# Patient Record
Sex: FEMALE | Race: WHITE
[De-identification: names, ages, dates, MRNs, and addresses within clinical notes are randomized per-mention and may not be internally consistent; named-entity substitution may affect disease eponyms.]

---

## 2019-04-27 ENCOUNTER — HOSPITAL ENCOUNTER (OUTPATIENT)
Dept: HOSPITAL 96 - M.INFUS | Age: 59
End: 2019-04-27
Payer: COMMERCIAL

## 2019-04-27 DIAGNOSIS — N30.00: Primary | ICD-10-CM

## 2019-04-28 ENCOUNTER — HOSPITAL ENCOUNTER (OUTPATIENT)
Dept: HOSPITAL 96 - M.INFUS | Age: 59
End: 2019-04-28
Payer: COMMERCIAL

## 2019-04-28 DIAGNOSIS — N30.00: Primary | ICD-10-CM

## 2020-01-15 ENCOUNTER — HOSPITAL ENCOUNTER (OUTPATIENT)
Dept: HOSPITAL 96 - M.INFUS | Age: 60
End: 2020-01-15
Attending: NURSE PRACTITIONER
Payer: COMMERCIAL

## 2020-01-15 VITALS — DIASTOLIC BLOOD PRESSURE: 80 MMHG | SYSTOLIC BLOOD PRESSURE: 129 MMHG

## 2020-01-15 DIAGNOSIS — Z88.9: ICD-10-CM

## 2020-01-15 DIAGNOSIS — N30.90: Primary | ICD-10-CM

## 2020-01-15 DIAGNOSIS — B96.1: ICD-10-CM

## 2020-01-15 LAB
BILIRUB UR-MCNC: NEGATIVE MG/DL
COLOR UR: YELLOW
KETONES UR STRIP-MCNC: NEGATIVE MG/DL
PROT UR QL STRIP: NEGATIVE
RBC # UR STRIP: (no result) /UL
SP GR UR STRIP: 1.02 (ref 1–1.03)
URINE CLARITY: CLEAR
URINE GLUCOSE-RANDOM: NEGATIVE
URINE LEUKOCYTES-REFLEX: NEGATIVE
URINE NITRITE-REFLEX: NEGATIVE
UROBILINOGEN UR STRIP-ACNC: 0.2 E.U./DL (ref 0.2–1)

## 2020-01-15 NOTE — NUR
ARRIVED AMBULATORY. HISTORY,MEDS AND ALLERGY REVIEWED AND UPDATED. MIDILNE
PLACED BY INFUSION STAFF. INFUSION COMPLETED AND TOELRATED WELL. MIDLINE
FLUSHED AND WRAPPED FOR USE NEXT 4+ DAYS. DISCHRGE INTRUCTION REVIEWED. DENIES
QUESTIONS OR NEEDS AT DISCHARGE.

## 2020-01-16 ENCOUNTER — HOSPITAL ENCOUNTER (OUTPATIENT)
Dept: HOSPITAL 96 - M.INFUS | Age: 60
End: 2020-01-16
Attending: NURSE PRACTITIONER
Payer: COMMERCIAL

## 2020-01-16 DIAGNOSIS — B96.1: ICD-10-CM

## 2020-01-16 DIAGNOSIS — N30.90: Primary | ICD-10-CM

## 2020-01-16 DIAGNOSIS — Z88.9: ICD-10-CM

## 2020-01-17 ENCOUNTER — HOSPITAL ENCOUNTER (OUTPATIENT)
Dept: HOSPITAL 96 - M.INFUS | Age: 60
End: 2020-01-17
Attending: NURSE PRACTITIONER
Payer: COMMERCIAL

## 2020-01-17 VITALS — DIASTOLIC BLOOD PRESSURE: 78 MMHG | SYSTOLIC BLOOD PRESSURE: 127 MMHG

## 2020-01-17 DIAGNOSIS — Z88.9: ICD-10-CM

## 2020-01-17 DIAGNOSIS — N30.90: Primary | ICD-10-CM

## 2020-01-17 DIAGNOSIS — B96.1: ICD-10-CM

## 2020-01-18 ENCOUNTER — HOSPITAL ENCOUNTER (OUTPATIENT)
Dept: HOSPITAL 96 - M.INFUS | Age: 60
End: 2020-01-18
Attending: NURSE PRACTITIONER
Payer: COMMERCIAL

## 2020-01-18 DIAGNOSIS — Z88.9: ICD-10-CM

## 2020-01-18 DIAGNOSIS — N30.90: Primary | ICD-10-CM

## 2020-01-18 DIAGNOSIS — B96.1: ICD-10-CM

## 2020-01-19 ENCOUNTER — HOSPITAL ENCOUNTER (OUTPATIENT)
Dept: HOSPITAL 96 - M.INFUS | Age: 60
End: 2020-01-19
Attending: NURSE PRACTITIONER
Payer: COMMERCIAL

## 2020-01-19 DIAGNOSIS — Z88.9: ICD-10-CM

## 2020-01-19 DIAGNOSIS — N30.90: Primary | ICD-10-CM

## 2020-01-19 DIAGNOSIS — B96.1: ICD-10-CM

## 2020-02-01 ENCOUNTER — HOSPITAL ENCOUNTER (OUTPATIENT)
Dept: HOSPITAL 96 - M.INFUS | Age: 60
End: 2020-02-01
Attending: NURSE PRACTITIONER
Payer: COMMERCIAL

## 2020-02-01 DIAGNOSIS — B96.1: ICD-10-CM

## 2020-02-01 DIAGNOSIS — Z88.9: ICD-10-CM

## 2020-02-01 DIAGNOSIS — N30.90: Primary | ICD-10-CM

## 2020-02-02 ENCOUNTER — HOSPITAL ENCOUNTER (OUTPATIENT)
Dept: HOSPITAL 96 - M.INFUS | Age: 60
End: 2020-02-02
Attending: NURSE PRACTITIONER
Payer: COMMERCIAL

## 2020-02-02 DIAGNOSIS — N30.90: Primary | ICD-10-CM

## 2020-02-02 DIAGNOSIS — Z88.9: ICD-10-CM

## 2020-02-02 DIAGNOSIS — B96.1: ICD-10-CM

## 2020-02-03 ENCOUNTER — HOSPITAL ENCOUNTER (OUTPATIENT)
Dept: HOSPITAL 96 - M.INFUS | Age: 60
End: 2020-02-03
Attending: NURSE PRACTITIONER
Payer: COMMERCIAL

## 2020-02-03 VITALS — DIASTOLIC BLOOD PRESSURE: 75 MMHG | SYSTOLIC BLOOD PRESSURE: 122 MMHG

## 2020-02-03 DIAGNOSIS — B96.1: ICD-10-CM

## 2020-02-03 DIAGNOSIS — N30.90: Primary | ICD-10-CM

## 2020-02-04 ENCOUNTER — HOSPITAL ENCOUNTER (OUTPATIENT)
Dept: HOSPITAL 96 - M.INFUS | Age: 60
End: 2020-02-04
Attending: NURSE PRACTITIONER
Payer: COMMERCIAL

## 2020-02-04 VITALS — DIASTOLIC BLOOD PRESSURE: 72 MMHG | SYSTOLIC BLOOD PRESSURE: 118 MMHG

## 2020-02-04 DIAGNOSIS — N30.90: Primary | ICD-10-CM

## 2020-02-04 DIAGNOSIS — B96.1: ICD-10-CM

## 2020-02-05 ENCOUNTER — HOSPITAL ENCOUNTER (OUTPATIENT)
Dept: HOSPITAL 96 - M.INFUS | Age: 60
End: 2020-02-05
Attending: NURSE PRACTITIONER
Payer: COMMERCIAL

## 2020-02-05 VITALS — SYSTOLIC BLOOD PRESSURE: 128 MMHG | DIASTOLIC BLOOD PRESSURE: 78 MMHG

## 2020-02-05 DIAGNOSIS — N30.90: Primary | ICD-10-CM

## 2020-02-05 DIAGNOSIS — B96.1: ICD-10-CM

## 2020-02-05 DIAGNOSIS — Z88.9: ICD-10-CM

## 2020-02-06 ENCOUNTER — HOSPITAL ENCOUNTER (OUTPATIENT)
Dept: HOSPITAL 96 - M.INFUS | Age: 60
End: 2020-02-06
Attending: NURSE PRACTITIONER
Payer: COMMERCIAL

## 2020-02-06 VITALS — DIASTOLIC BLOOD PRESSURE: 76 MMHG | SYSTOLIC BLOOD PRESSURE: 124 MMHG

## 2020-02-06 DIAGNOSIS — N30.90: Primary | ICD-10-CM

## 2020-02-06 DIAGNOSIS — Z88.9: ICD-10-CM

## 2020-02-06 DIAGNOSIS — B96.1: ICD-10-CM

## 2020-02-06 NOTE — NUR
DENIES ADVERSE REACTION TO MULTIPLE INFUSIONS OF SAME. INFUSION COMPELTED AND
TOELERATED WELL. MIDLINE PATENT WTIH EASY FLUSH AND BLOOD RETURN.

## 2020-02-07 ENCOUNTER — HOSPITAL ENCOUNTER (OUTPATIENT)
Dept: HOSPITAL 96 - M.INFUS | Age: 60
End: 2020-02-07
Attending: NURSE PRACTITIONER
Payer: COMMERCIAL

## 2020-02-07 VITALS — SYSTOLIC BLOOD PRESSURE: 122 MMHG | DIASTOLIC BLOOD PRESSURE: 74 MMHG

## 2020-02-07 DIAGNOSIS — N30.90: Primary | ICD-10-CM

## 2020-02-07 DIAGNOSIS — B95.1: ICD-10-CM

## 2020-02-07 DIAGNOSIS — Z88.9: ICD-10-CM

## 2020-02-08 ENCOUNTER — HOSPITAL ENCOUNTER (OUTPATIENT)
Dept: HOSPITAL 96 - M.INFUS | Age: 60
End: 2020-02-08
Attending: NURSE PRACTITIONER
Payer: COMMERCIAL

## 2020-02-08 DIAGNOSIS — N30.90: Primary | ICD-10-CM

## 2020-02-08 DIAGNOSIS — B96.1: ICD-10-CM

## 2020-02-08 DIAGNOSIS — Z88.9: ICD-10-CM

## 2020-02-09 ENCOUNTER — HOSPITAL ENCOUNTER (OUTPATIENT)
Dept: HOSPITAL 96 - M.INFUS | Age: 60
End: 2020-02-09
Attending: NURSE PRACTITIONER
Payer: COMMERCIAL

## 2020-02-09 DIAGNOSIS — B96.1: ICD-10-CM

## 2020-02-09 DIAGNOSIS — N30.90: Primary | ICD-10-CM

## 2020-02-09 DIAGNOSIS — Z88.9: ICD-10-CM

## 2020-02-10 ENCOUNTER — HOSPITAL ENCOUNTER (OUTPATIENT)
Dept: HOSPITAL 96 - M.INFUS | Age: 60
End: 2020-02-10
Attending: NURSE PRACTITIONER
Payer: COMMERCIAL

## 2020-02-10 VITALS — DIASTOLIC BLOOD PRESSURE: 71 MMHG | SYSTOLIC BLOOD PRESSURE: 121 MMHG

## 2020-02-10 DIAGNOSIS — N30.90: Primary | ICD-10-CM

## 2020-02-10 DIAGNOSIS — Z88.9: ICD-10-CM

## 2020-02-10 DIAGNOSIS — B96.1: ICD-10-CM

## 2020-06-29 ENCOUNTER — HOSPITAL ENCOUNTER (OUTPATIENT)
Dept: HOSPITAL 96 - M.PC | Age: 60
End: 2020-06-29
Attending: PHYSICAL MEDICINE & REHABILITATION
Payer: COMMERCIAL

## 2020-06-29 DIAGNOSIS — M50.30: ICD-10-CM

## 2020-06-29 DIAGNOSIS — G96.19: ICD-10-CM

## 2020-06-29 DIAGNOSIS — M47.22: Primary | ICD-10-CM

## 2020-06-29 DIAGNOSIS — Z88.8: ICD-10-CM

## 2020-06-29 DIAGNOSIS — Z79.899: ICD-10-CM

## 2020-10-17 ENCOUNTER — HOSPITAL ENCOUNTER (EMERGENCY)
Dept: HOSPITAL 96 - M.ERS | Age: 60
Discharge: HOME | End: 2020-10-17
Payer: COMMERCIAL

## 2020-10-17 VITALS — HEIGHT: 62 IN | BODY MASS INDEX: 23.92 KG/M2 | WEIGHT: 130.01 LBS

## 2020-10-17 VITALS — DIASTOLIC BLOOD PRESSURE: 78 MMHG | SYSTOLIC BLOOD PRESSURE: 119 MMHG

## 2020-10-17 DIAGNOSIS — Z88.1: ICD-10-CM

## 2020-10-17 DIAGNOSIS — Z90.49: ICD-10-CM

## 2020-10-17 DIAGNOSIS — Z88.2: ICD-10-CM

## 2020-10-17 DIAGNOSIS — U07.1: Primary | ICD-10-CM

## 2021-03-11 ENCOUNTER — APPOINTMENT (RX ONLY)
Dept: URBAN - METROPOLITAN AREA CLINIC 142 | Facility: CLINIC | Age: 61
Setting detail: DERMATOLOGY
End: 2021-03-11

## 2021-03-11 DIAGNOSIS — L738 OTHER SPECIFIED DISEASES OF HAIR AND HAIR FOLLICLES: ICD-10-CM | Status: INADEQUATELY CONTROLLED

## 2021-03-11 DIAGNOSIS — L73.9 FOLLICULAR DISORDER, UNSPECIFIED: ICD-10-CM | Status: INADEQUATELY CONTROLLED

## 2021-03-11 DIAGNOSIS — L663 OTHER SPECIFIED DISEASES OF HAIR AND HAIR FOLLICLES: ICD-10-CM | Status: INADEQUATELY CONTROLLED

## 2021-03-11 DIAGNOSIS — L73.8 OTHER SPECIFIED FOLLICULAR DISORDERS: ICD-10-CM

## 2021-03-11 DIAGNOSIS — L65.0 TELOGEN EFFLUVIUM: ICD-10-CM | Status: INADEQUATELY CONTROLLED

## 2021-03-11 PROBLEM — L02.821 FURUNCLE OF HEAD [ANY PART, EXCEPT FACE]: Status: ACTIVE | Noted: 2021-03-11

## 2021-03-11 PROBLEM — L02.12 FURUNCLE OF NECK: Status: ACTIVE | Noted: 2021-03-11

## 2021-03-11 PROCEDURE — ? SKIN MEDICINALS

## 2021-03-11 PROCEDURE — ? PRESCRIPTION

## 2021-03-11 PROCEDURE — 99204 OFFICE O/P NEW MOD 45 MIN: CPT

## 2021-03-11 PROCEDURE — ? COUNSELING

## 2021-03-11 RX ORDER — TRETIONIN 0.25 MG/G
CREAM TOPICAL
Qty: 1 | Refills: 3 | Status: ERX | COMMUNITY
Start: 2021-03-11

## 2021-03-11 RX ORDER — CLINDAMYCIN PHOSPHATE 10 MG/ML
LOTION TOPICAL BID
Qty: 1 | Refills: 6 | Status: ERX | COMMUNITY
Start: 2021-03-11

## 2021-03-11 RX ADMIN — TRETIONIN: 0.25 CREAM TOPICAL at 00:00

## 2021-03-11 RX ADMIN — CLINDAMYCIN PHOSPHATE: 10 LOTION TOPICAL at 00:00

## 2021-03-11 ASSESSMENT — LOCATION SIMPLE DESCRIPTION DERM
LOCATION SIMPLE: SCALP
LOCATION SIMPLE: LEFT CHEEK
LOCATION SIMPLE: POSTERIOR NECK
LOCATION SIMPLE: LEFT SCALP

## 2021-03-11 ASSESSMENT — LOCATION ZONE DERM
LOCATION ZONE: FACE
LOCATION ZONE: SCALP
LOCATION ZONE: NECK

## 2021-03-11 ASSESSMENT — LOCATION DETAILED DESCRIPTION DERM
LOCATION DETAILED: LEFT CENTRAL MALAR CHEEK
LOCATION DETAILED: LEFT SUPERIOR PARIETAL SCALP
LOCATION DETAILED: LEFT CENTRAL FRONTAL SCALP
LOCATION DETAILED: MID POSTERIOR NECK

## 2021-03-11 ASSESSMENT — PAIN INTENSITY VAS: HOW INTENSE IS YOUR PAIN 0 BEING NO PAIN, 10 BEING THE MOST SEVERE PAIN POSSIBLE?: NO PAIN

## 2021-03-11 NOTE — PROCEDURE: [facility] [employer]
Sig: Apply pea sized amount per area at night
Sig: Apply to affected areas on face twice daily
Sig: Apply twice daily for 5 days
Intro Statement: I recommended the following products:OTCs Retoinls  (if Rx too expensive)
Sig: Apply to affected areas twice daily
Sig: Apply nightly to warts nightly under occlusion
Sig: Apply a thin layer to affected areas twice daily for 6 weeks
Sig: Wash affected areas daily.
Detail Level: Generalized
Product Type (1): Anti-Aging

## 2021-03-11 NOTE — HPI: HAIR LOSS
Previous Labs: Yes
How Did The Hair Loss Occur?: sudden in onset
How Severe Is Your Hair Loss?: severe
Lab Details: 2/2021

## 2021-12-22 ENCOUNTER — HOSPITAL ENCOUNTER (EMERGENCY)
Dept: HOSPITAL 96 - M.ERS | Age: 61
Discharge: HOME | End: 2021-12-22
Payer: COMMERCIAL

## 2021-12-22 VITALS — DIASTOLIC BLOOD PRESSURE: 80 MMHG | SYSTOLIC BLOOD PRESSURE: 144 MMHG

## 2021-12-22 VITALS — BODY MASS INDEX: 23.92 KG/M2 | HEIGHT: 62 IN | WEIGHT: 130.01 LBS

## 2021-12-22 DIAGNOSIS — R53.1: Primary | ICD-10-CM

## 2021-12-22 DIAGNOSIS — Z87.891: ICD-10-CM

## 2021-12-22 DIAGNOSIS — Z79.899: ICD-10-CM

## 2021-12-22 DIAGNOSIS — Z88.2: ICD-10-CM

## 2021-12-22 DIAGNOSIS — R53.83: ICD-10-CM

## 2021-12-22 DIAGNOSIS — Z86.14: ICD-10-CM

## 2021-12-22 DIAGNOSIS — F32.9: ICD-10-CM

## 2021-12-22 DIAGNOSIS — H53.8: ICD-10-CM

## 2021-12-22 DIAGNOSIS — Z90.49: ICD-10-CM

## 2021-12-22 DIAGNOSIS — Z88.1: ICD-10-CM

## 2021-12-22 LAB
ABSOLUTE BASOPHILS: 0.1 THOU/UL (ref 0–0.2)
ABSOLUTE EOSINOPHILS: 0.2 THOU/UL (ref 0–0.7)
ABSOLUTE MONOCYTES: 0.7 THOU/UL (ref 0–1.2)
ALBUMIN SERPL-MCNC: 4.1 G/DL (ref 3.4–5)
ALP SERPL-CCNC: 54 U/L (ref 46–116)
ALT SERPL-CCNC: 19 U/L (ref 30–65)
ANION GAP SERPL CALC-SCNC: 6 MMOL/L (ref 7–16)
AST SERPL-CCNC: 12 U/L (ref 15–37)
BASOPHILS NFR BLD AUTO: 1.3 %
BILIRUB SERPL-MCNC: 1 MG/DL
BUN SERPL-MCNC: 12 MG/DL (ref 7–18)
CALCIUM SERPL-MCNC: 9.2 MG/DL (ref 8.5–10.1)
CHLORIDE SERPL-SCNC: 102 MMOL/L (ref 98–107)
CO2 SERPL-SCNC: 30 MMOL/L (ref 21–32)
CREAT SERPL-MCNC: 0.9 MG/DL (ref 0.6–1.3)
EOSINOPHIL NFR BLD: 2.5 %
GLUCOSE SERPL-MCNC: 99 MG/DL (ref 70–99)
GRANULOCYTES NFR BLD MANUAL: 48 %
HCT VFR BLD CALC: 38.3 % (ref 37–47)
HGB BLD-MCNC: 12.9 GM/DL (ref 12–15)
LYMPHOCYTES # BLD: 3.1 THOU/UL (ref 0.8–5.3)
LYMPHOCYTES NFR BLD AUTO: 39.4 %
MCH RBC QN AUTO: 32.7 PG (ref 26–34)
MCHC RBC AUTO-ENTMCNC: 33.6 G/DL (ref 28–37)
MCV RBC: 97.3 FL (ref 80–100)
MONOCYTES NFR BLD: 8.8 %
MPV: 6.3 FL. (ref 7.2–11.1)
NEUTROPHILS # BLD: 3.8 THOU/UL (ref 1.6–8.1)
NUCLEATED RBCS: 0 /100WBC
PLATELET COUNT*: 485 THOU/UL (ref 150–400)
POTASSIUM SERPL-SCNC: 4.1 MMOL/L (ref 3.5–5.1)
PROT SERPL-MCNC: 7.3 G/DL (ref 6.4–8.2)
RBC # BLD AUTO: 3.94 MIL/UL (ref 4.2–5)
RDW-CV: 13 % (ref 10.5–14.5)
SODIUM SERPL-SCNC: 138 MMOL/L (ref 136–145)
WBC # BLD AUTO: 7.9 THOU/UL (ref 4–11)